# Patient Record
Sex: FEMALE | Race: WHITE | NOT HISPANIC OR LATINO | URBAN - METROPOLITAN AREA
[De-identification: names, ages, dates, MRNs, and addresses within clinical notes are randomized per-mention and may not be internally consistent; named-entity substitution may affect disease eponyms.]

---

## 2018-09-17 DIAGNOSIS — Z78.0 MENOPAUSE: Primary | ICD-10-CM

## 2018-09-17 DIAGNOSIS — Z13.820 SCREENING FOR OSTEOPOROSIS: ICD-10-CM

## 2018-09-20 ENCOUNTER — HOSPITAL ENCOUNTER (OUTPATIENT)
Dept: RADIOLOGY | Facility: HOSPITAL | Age: 63
Discharge: HOME | End: 2018-09-20
Attending: INTERNAL MEDICINE
Payer: COMMERCIAL

## 2018-09-20 DIAGNOSIS — Z78.0 MENOPAUSE: ICD-10-CM

## 2018-09-20 DIAGNOSIS — Z13.820 SCREENING FOR OSTEOPOROSIS: ICD-10-CM

## 2018-09-20 PROCEDURE — 77080 DXA BONE DENSITY AXIAL: CPT | Mod: TC

## 2018-09-20 PROCEDURE — 77080 DXA BONE DENSITY AXIAL: CPT | Mod: 26,,, | Performed by: RADIOLOGY

## 2018-11-19 ENCOUNTER — HOSPITAL ENCOUNTER (OUTPATIENT)
Dept: RADIOLOGY | Facility: HOSPITAL | Age: 63
Discharge: HOME | End: 2018-11-19
Attending: INTERNAL MEDICINE
Payer: COMMERCIAL

## 2018-11-19 DIAGNOSIS — M25.561 RIGHT KNEE PAIN: Primary | ICD-10-CM

## 2018-11-19 DIAGNOSIS — M25.561 RIGHT KNEE PAIN: ICD-10-CM

## 2018-11-19 PROCEDURE — 73562 X-RAY EXAM OF KNEE 3: CPT | Mod: 26,RT,, | Performed by: RADIOLOGY

## 2018-11-19 PROCEDURE — 73562 X-RAY EXAM OF KNEE 3: CPT | Mod: TC,FY,RT

## 2018-11-30 DIAGNOSIS — M25.561 RIGHT KNEE PAIN: Primary | ICD-10-CM

## 2018-12-03 ENCOUNTER — HOSPITAL ENCOUNTER (OUTPATIENT)
Dept: RADIOLOGY | Facility: HOSPITAL | Age: 63
Discharge: HOME | End: 2018-12-03
Attending: INTERNAL MEDICINE
Payer: COMMERCIAL

## 2018-12-03 DIAGNOSIS — M25.561 RIGHT KNEE PAIN: ICD-10-CM

## 2018-12-03 PROCEDURE — 73721 MRI JNT OF LWR EXTRE W/O DYE: CPT | Mod: TC,RT

## 2018-12-03 PROCEDURE — 73721 MRI JNT OF LWR EXTRE W/O DYE: CPT | Mod: 26,RT,, | Performed by: RADIOLOGY

## 2018-12-06 ENCOUNTER — TELEPHONE (OUTPATIENT)
Dept: ORTHOPEDICS | Facility: CLINIC | Age: 63
End: 2018-12-06

## 2018-12-06 NOTE — TELEPHONE ENCOUNTER
Called patient to schedule referred appointment from Dr. Olea with Dr. Kovacs for treatment of right knee pain. Appointment made and patient voiced understanding of appointment date, time, and location.

## 2018-12-10 ENCOUNTER — OFFICE VISIT (OUTPATIENT)
Dept: ORTHOPEDICS | Facility: CLINIC | Age: 63
End: 2018-12-10
Payer: COMMERCIAL

## 2018-12-10 ENCOUNTER — TELEPHONE (OUTPATIENT)
Dept: ORTHOPEDICS | Facility: CLINIC | Age: 63
End: 2018-12-10

## 2018-12-10 VITALS
HEART RATE: 74 BPM | SYSTOLIC BLOOD PRESSURE: 177 MMHG | DIASTOLIC BLOOD PRESSURE: 71 MMHG | BODY MASS INDEX: 31.67 KG/M2 | HEIGHT: 68 IN | WEIGHT: 209 LBS

## 2018-12-10 DIAGNOSIS — M17.11 PRIMARY OSTEOARTHRITIS OF RIGHT KNEE: Primary | ICD-10-CM

## 2018-12-10 DIAGNOSIS — M23.203 DEGENERATIVE TEAR OF MEDIAL MENISCUS, RIGHT: Primary | ICD-10-CM

## 2018-12-10 DIAGNOSIS — M17.11 PRIMARY OSTEOARTHRITIS OF RIGHT KNEE: ICD-10-CM

## 2018-12-10 DIAGNOSIS — M71.21 BAKER CYST, RIGHT: ICD-10-CM

## 2018-12-10 PROCEDURE — 99999 PR PBB SHADOW E&M-EST. PATIENT-LVL III: CPT | Mod: PBBFAC,,, | Performed by: ORTHOPAEDIC SURGERY

## 2018-12-10 PROCEDURE — 20610 DRAIN/INJ JOINT/BURSA W/O US: CPT | Mod: RT,S$GLB,, | Performed by: ORTHOPAEDIC SURGERY

## 2018-12-10 PROCEDURE — 3008F BODY MASS INDEX DOCD: CPT | Mod: S$GLB,,, | Performed by: ORTHOPAEDIC SURGERY

## 2018-12-10 PROCEDURE — 99204 OFFICE O/P NEW MOD 45 MIN: CPT | Mod: 25,S$GLB,, | Performed by: ORTHOPAEDIC SURGERY

## 2018-12-10 RX ORDER — CLOPIDOGREL BISULFATE 75 MG/1
75 TABLET ORAL
Refills: 0 | COMMUNITY
Start: 2018-11-11

## 2018-12-10 RX ORDER — ALPRAZOLAM 0.5 MG/1
0.5 TABLET ORAL
Refills: 2 | COMMUNITY
Start: 2018-11-02

## 2018-12-10 RX ORDER — ROSUVASTATIN CALCIUM 20 MG/1
20 TABLET, COATED ORAL
Refills: 1 | COMMUNITY
Start: 2018-11-11

## 2018-12-10 RX ORDER — ASPIRIN 81 MG/1
81 TABLET ORAL DAILY
COMMUNITY

## 2018-12-10 RX ORDER — HYDROCODONE BITARTRATE AND ACETAMINOPHEN 10; 325 MG/1; MG/1
10 TABLET ORAL
Refills: 0 | COMMUNITY
Start: 2018-12-01

## 2018-12-10 RX ORDER — AMLODIPINE BESYLATE 5 MG/1
5 TABLET ORAL
Refills: 1 | COMMUNITY
Start: 2018-10-02

## 2018-12-10 RX ORDER — LEVOTHYROXINE SODIUM 100 UG/1
100 TABLET ORAL
Refills: 1 | COMMUNITY
Start: 2018-10-02

## 2018-12-10 RX ORDER — ATENOLOL 25 MG/1
25 TABLET ORAL
Refills: 0 | COMMUNITY
Start: 2018-10-02

## 2018-12-10 RX ORDER — DICLOFENAC SODIUM 10 MG/G
1 GEL TOPICAL
Refills: 5 | COMMUNITY
Start: 2018-11-21

## 2018-12-10 RX ADMIN — TRIAMCINOLONE ACETONIDE 80 MG: 40 INJECTION, SUSPENSION INTRA-ARTICULAR; INTRAMUSCULAR at 08:12

## 2018-12-10 NOTE — LETTER
December 12, 2018      Rodolfo Olea MD  [address]  [facility] [place] [address]           [facility]  [address] [place] [address]  Phone: [phone]  Fax: [phone]          Patient: Lanette Fink   MR Number: [id number]   YOB: 1955   Date of Visit: 12/10/2018       Dear Dr. Rodolfo Olea:    Thank you for referring Lanette Fink to me for evaluation. Attached you will find relevant portions of my assessment and plan of care.    If you have questions, please do not hesitate to call me. I look forward to following Lanette Fink along with you.    Sincerely,    Lester Kovacs, DO    Enclosure  CC:  No Recipients    If you would like to receive this communication electronically, please contact [email or web address] or [phone] to request more information on [facility] access.    For providers and/or their staff who would like to refer a patient to [facility], please contact us through our one-stop-shop provider referral line, [facility] , at [phone].    If you feel you have received this communication in error or would no longer like to receive these types of communications, please e-mail [email or web address]

## 2018-12-10 NOTE — TELEPHONE ENCOUNTER
Called patient to inform her that she has a synvisc-one injection appointment on January 7, 2019 at 11:30 AM. Patient notified.

## 2018-12-12 PROBLEM — M17.11 PRIMARY OSTEOARTHRITIS OF RIGHT KNEE: Status: ACTIVE | Noted: 2018-12-12

## 2018-12-12 PROBLEM — M23.203 DEGENERATIVE TEAR OF MEDIAL MENISCUS, RIGHT: Status: ACTIVE | Noted: 2018-12-12

## 2018-12-12 PROBLEM — M71.21 BAKER CYST, RIGHT: Status: ACTIVE | Noted: 2018-12-12

## 2018-12-12 RX ORDER — TRIAMCINOLONE ACETONIDE 40 MG/ML
80 INJECTION, SUSPENSION INTRA-ARTICULAR; INTRAMUSCULAR
Status: DISCONTINUED | OUTPATIENT
Start: 2018-12-10 | End: 2018-12-12 | Stop reason: HOSPADM

## 2018-12-12 NOTE — PROGRESS NOTES
Subjective:      Patient ID: Lanette Fink is a 63 y.o. female.    Chief Complaint: Pain of the Right Knee    Referring Provider: Rodolfo Olea Md  [address]  [place] [address]    HPI:  Ms. Fink is a 63-year-old female who presented today for evaluation of 6 months of right knee pain increasing intensity over the last 6 weeks.  Walking increases her pain while heat and ice improves her symptoms. Her symptoms are worse in the morning when she 1st gets started in improve as she ambulates.  The symptoms are of insidious onset she denied trauma.  She gets swelling but denies giving way and locking.  She has taken NSAIDs without help and had an injection of which the 1st 1 helped but the 2nd 1 did not.  She wears a brace which gives her questionable help his she has not done physical therapy.    Past Medical History:   Diagnosis Date    Heart disease coronary artery disease      *  *  *  *  *  *  * Thyroid disease  Hypertension  Hyperlipidemia  Anxiety  COPD  Hiatal hernia  Headaches  Chronic anticoagulation      Past Surgical History:   Procedure Laterality Date    CARDIAC SURGERY      CAROTID ENDARTERECTOMY  10/2016    CORONARY ARTERY BYPASS GRAFT (CABG)  06/2016     * TUBAL LIGATION  COLONOSCOPY         Review of patient's allergies indicates:  No Known Allergies    Social History     Occupational History    Retired    Tobacco Use    Smoking status: Former Smoker    Smokeless tobacco: Never Used   Substance and Sexual Activity    Alcohol use: Yes    Drug use: No    Sexual activity: No      Family History   Problem Relation Age of Onset    Heart disease Mother     Heart disease Father     Heart disease Sister     Heart disease Brother     Heart disease Brother     No Known Problems Son     No Known Problems Daughter        Previous Hospitalizations:  Childbirth, 3 vessel CABG.    ROS:   Review of Systems   Constitution: Negative for chills and fever.   HENT:  Negative for congestion.    Eyes: Negative for blurred vision.   Cardiovascular: Negative for chest pain.   Respiratory: Negative for cough.    Endocrine: Negative for polydipsia.   Hematologic/Lymphatic: Does not bruise/bleed easily.   Skin: Negative for itching.   Musculoskeletal: Positive for joint pain and joint swelling.   Gastrointestinal: Negative for heartburn.   Genitourinary: Negative for nocturia.   Neurological: Negative for dizziness.   Psychiatric/Behavioral: The patient is nervous/anxious.    Allergic/Immunologic: Negative for environmental allergies.         Objective:      Physical Exam:   General: AAOx3.  No acute distress  HEENT: Normocephalic, PEARLA EOMI, Good Dentition  Neck: Supple, No JVD  Chest: Symetric, equal excursion on inspiration  Abdomen: Soft NTND  Vascular:  Pulses intact and equal bilaterally.  Capillary refill less than 3 seconds and equal bilaterally  Neurologic:  Pinprick and soft touch intact and equal bilaterally  Integment:  No ecchymosis, no errythema  Extremity:  Knee:  Extension/flexion equal bilaterally 0/128°.  Mild effusion right knee. Mild crepitus with motion both knees.  Negative patellar load/compression both knees.  Baker cyst right knee. Negative patella apprehension/relocation both knees.  Varus/valgus stressing with mild increased excursion bilaterally but endpoint present.  Lachman's/drawer equal bilaterally with good endpoint.  Positive medial joint line tenderness right knee. Carlos negative both knees.  Misty positive right knee.  Nontender at the anserine insertion both knees.  No swelling at the anserine insertion both knees.  Radiography:  Personally reviewed x-rays of the right knee completed on 11/19/2018 which showed early arthritic changes and a small medial femoral osteophyte with early chondrocalcinosis.      Assessment:       Impression:      1. Degenerative tear of medial meniscus, right    2. Primary osteoarthritis of right knee    3. Baker  cyst, right          Plan:       1.  Discussed physical examination and radiographic findings with the patient. Lanette understands that she has arthritis of her knee with a degenerative meniscus tear.  She understands she can either trial more conservative management or proceed with surgical intervention if she proceed with surgical intervention recommend she have arthroscopy of her knee as she may improve.  She stated she would like to trial some more conservative management.  And if she fails conservative care then she would like to consider surgery at that time. Discussed with the patient that she could also trial Synvisc but it has to be preapproved for injection. She stated she would like to trial Synvisc if possible.  2.  In order to help palliate some of her symptoms offered a steroid injection to the right knee, she elected to proceed.  3.  Brace wear discussed with the patient she understands she should continue to wear.  4.  Would not recommend this patient take NSAIDs as she is on Plavix.  5.  Will hold off on referral to physical therapy to see if she improves and if she does not she may proceed with surgery needed for postoperative convalescence.  6.  Home exercises were discussed with the patient.  7.  Submit for pre approval for Synvisc injections.  8.  Ambulate as tolerated but avoid inciting activities.  9.  Follow up when Synvisc has been approved for injection.  3.

## 2018-12-12 NOTE — PROCEDURES
Large Joint Aspiration/Injection: R knee  Date/Time: 12/10/2018 8:31 AM  Performed by: Lester Kovacs DO  Authorized by: Lester Kovacs, DO     Consent Done?:  Yes (Verbal)  Indications:  Pain, joint swelling and diagnostic evaluation  Procedure site marked: Yes    Timeout: Prior to procedure the correct patient, procedure, and site was verified      Location:  Knee  Site:  R knee  Prep: Patient was prepped and draped in usual sterile fashion    Ultrasonic Guidance for needle placement: No  Needle size:  22 G  Approach:  Anterolateral  Medications:  80 mg triamcinolone acetonide 40 mg/mL  Patient tolerance:  Patient tolerated the procedure well with no immediate complications

## 2019-03-28 DIAGNOSIS — S83.249A MEDIAL MENISCUS TEAR: ICD-10-CM

## 2019-03-28 DIAGNOSIS — M17.10 PRIMARY LOCALIZED OSTEOARTHROSIS, LOWER LEG: Primary | ICD-10-CM

## 2019-04-05 ENCOUNTER — HOSPITAL ENCOUNTER (OUTPATIENT)
Dept: RADIOLOGY | Facility: HOSPITAL | Age: 64
Discharge: HOME | End: 2019-04-05
Attending: ORTHOPAEDIC SURGERY
Payer: COMMERCIAL

## 2019-04-05 DIAGNOSIS — S83.249A MEDIAL MENISCUS TEAR: ICD-10-CM

## 2019-04-05 DIAGNOSIS — M17.10 PRIMARY LOCALIZED OSTEOARTHROSIS, LOWER LEG: ICD-10-CM

## 2019-04-05 PROCEDURE — 73721 MRI JNT OF LWR EXTRE W/O DYE: CPT | Mod: TC,RT

## 2019-04-05 PROCEDURE — 73721 MRI JNT OF LWR EXTRE W/O DYE: CPT | Mod: 26,RT,, | Performed by: RADIOLOGY

## 2019-04-05 PROCEDURE — 73721 MRI KNEE WITHOUT CONTRAST RIGHT: ICD-10-PCS | Mod: 26,RT,, | Performed by: RADIOLOGY
